# Patient Record
Sex: FEMALE | Race: WHITE | Employment: OTHER | URBAN - METROPOLITAN AREA
[De-identification: names, ages, dates, MRNs, and addresses within clinical notes are randomized per-mention and may not be internally consistent; named-entity substitution may affect disease eponyms.]

---

## 2023-12-04 ENCOUNTER — OFFICE VISIT (OUTPATIENT)
Dept: RHEUMATOLOGY | Age: 78
End: 2023-12-04
Payer: MEDICARE

## 2023-12-04 VITALS
DIASTOLIC BLOOD PRESSURE: 64 MMHG | BODY MASS INDEX: 27.46 KG/M2 | HEIGHT: 68 IN | WEIGHT: 181.2 LBS | OXYGEN SATURATION: 96 % | HEART RATE: 80 BPM | SYSTOLIC BLOOD PRESSURE: 114 MMHG

## 2023-12-04 DIAGNOSIS — M25.60 STIFFNESS IN JOINT: Primary | ICD-10-CM

## 2023-12-04 LAB
C-REACTIVE PROTEIN: 12.7
SEDIMENTATION RATE, ERYTHROCYTE: 45

## 2023-12-04 PROCEDURE — G8419 CALC BMI OUT NRM PARAM NOF/U: HCPCS | Performed by: INTERNAL MEDICINE

## 2023-12-04 PROCEDURE — 1090F PRES/ABSN URINE INCON ASSESS: CPT | Performed by: INTERNAL MEDICINE

## 2023-12-04 PROCEDURE — 1036F TOBACCO NON-USER: CPT | Performed by: INTERNAL MEDICINE

## 2023-12-04 PROCEDURE — G8427 DOCREV CUR MEDS BY ELIG CLIN: HCPCS | Performed by: INTERNAL MEDICINE

## 2023-12-04 PROCEDURE — G8484 FLU IMMUNIZE NO ADMIN: HCPCS | Performed by: INTERNAL MEDICINE

## 2023-12-04 PROCEDURE — G8400 PT W/DXA NO RESULTS DOC: HCPCS | Performed by: INTERNAL MEDICINE

## 2023-12-04 PROCEDURE — 99204 OFFICE O/P NEW MOD 45 MIN: CPT | Performed by: INTERNAL MEDICINE

## 2023-12-04 PROCEDURE — 1123F ACP DISCUSS/DSCN MKR DOCD: CPT | Performed by: INTERNAL MEDICINE

## 2023-12-04 RX ORDER — OMEPRAZOLE 40 MG/1
40 CAPSULE, DELAYED RELEASE ORAL EVERY OTHER DAY
COMMUNITY
Start: 2023-06-26

## 2023-12-04 RX ORDER — TRIAMCINOLONE ACETONIDE 0.25 MG/G
CREAM TOPICAL
COMMUNITY
Start: 2023-10-26

## 2023-12-04 ASSESSMENT — ENCOUNTER SYMPTOMS
ABDOMINAL PAIN: 0
VOMITING: 0
SHORTNESS OF BREATH: 0
COUGH: 0
NAUSEA: 0

## 2023-12-04 NOTE — PROGRESS NOTES
[address] [facility]   Rheumatology Clinic Note      12/4/2023       Reason for Consult:  possible PMR. Requesting Physician:  CHI Collazo     CHIEF COMPLAINT:    Chief Complaint   Patient presents with    New Patient     Polymyalgia rheumatica    Other     Pt has extreme stiffness. Pt has stiffness hips and knees. HISTORY OF PRESENT ILLNESS:    65 y.o. female presents today for evaluation of stiffness. In January 2023, her lipitor dose was increased from 40 to 80 mg daily. She stopped lipitor then switched on Crestor. However, stiffness persisted. It is mostly in her hips, knees and lower back. Was prescribed prednsione taper by her PCP in July, and her stiffness improved significantly. Then came back shortly after. Stiffness is mainly in lower back and hips. Has difficulty bending and reaching the floor. No stiffness in morning upon awakening. Past Medical History:     has a past medical history of Cancer ([address]), Cancer ([address]), Hyperlipidemia, Hypertension, Nausea & vomiting, and Thyroid disease. Past Surgical History:     has a past surgical history that includes Tonsillectomy (01/01/2011); Hysterectomy (01/01/2012); Appendectomy (2012?? ); Breast surgery (01/01/1994); fracture surgery ([facility]); Colonoscopy; eye surgery (01/01/2014); skin biopsy; Dilation and curettage of uterus (01/01/2012); Hand surgery (09/01/1960); Mohs surgery (12/12/2014); and Cataract removal (Bilateral). Current Medications:      Current Outpatient Medications:     triamcinolone (KENALOG) 0.025 % cream, APPLY 1 APPLICATION TO FACE APPLY TO NOSE TWICE A DAY FOR 2 WEEKS. THEN DAILY FOR 2 WEEKS.  THEN STOP, Disp: , Rfl:     omeprazole (PRILOSEC) 40 MG delayed release capsule, Take 1 capsule by mouth every other day, Disp: , Rfl:     lisinopril (PRINIVIL;ZESTRIL) 5 MG tablet, Take 4 tablets by mouth daily, Disp: , Rfl:     amLODIPine (NORVASC) 5 MG tablet, Take 1 tablet by mouth daily, Disp:

## 2023-12-05 ENCOUNTER — TELEPHONE (OUTPATIENT)
Dept: RHEUMATOLOGY | Age: 78
End: 2023-12-05

## 2023-12-05 NOTE — TELEPHONE ENCOUNTER
Please inform pt that her ESR (Sed rate) came back at 45. It is slightly elevated, but not in the range that we see in polymyalgia rheumatica. When seen in office recently, she was overall doing well. However, if her pain and stiffness worsen, then she needs to call our office for re-evaluation.